# Patient Record
Sex: MALE | Race: WHITE | NOT HISPANIC OR LATINO | Employment: OTHER | URBAN - METROPOLITAN AREA
[De-identification: names, ages, dates, MRNs, and addresses within clinical notes are randomized per-mention and may not be internally consistent; named-entity substitution may affect disease eponyms.]

---

## 2020-01-22 ENCOUNTER — APPOINTMENT (OUTPATIENT)
Dept: CT IMAGING | Facility: HOSPITAL | Age: 75
End: 2020-01-22

## 2020-01-22 ENCOUNTER — APPOINTMENT (OUTPATIENT)
Dept: GENERAL RADIOLOGY | Facility: HOSPITAL | Age: 75
End: 2020-01-22

## 2020-01-22 ENCOUNTER — HOSPITAL ENCOUNTER (EMERGENCY)
Facility: HOSPITAL | Age: 75
Discharge: HOME | End: 2020-01-22
Attending: EMERGENCY MEDICINE | Admitting: EMERGENCY MEDICINE

## 2020-01-22 VITALS
WEIGHT: 192.9 LBS | DIASTOLIC BLOOD PRESSURE: 92 MMHG | OXYGEN SATURATION: 97 % | SYSTOLIC BLOOD PRESSURE: 152 MMHG | HEART RATE: 74 BPM | HEIGHT: 73 IN | BODY MASS INDEX: 25.57 KG/M2 | TEMPERATURE: 98.1 F | RESPIRATION RATE: 16 BRPM

## 2020-01-22 DIAGNOSIS — S01.81XA CHIN LACERATION, INITIAL ENCOUNTER: ICD-10-CM

## 2020-01-22 DIAGNOSIS — S02.609A CLOSED FRACTURE OF LEFT SIDE OF MANDIBLE, UNSPECIFIED MANDIBULAR SITE, INITIAL ENCOUNTER (HCC): Primary | ICD-10-CM

## 2020-01-22 PROCEDURE — 96365 THER/PROPH/DIAG IV INF INIT: CPT

## 2020-01-22 PROCEDURE — 70486 CT MAXILLOFACIAL W/O DYE: CPT

## 2020-01-22 PROCEDURE — 70450 CT HEAD/BRAIN W/O DYE: CPT

## 2020-01-22 PROCEDURE — 25010000003 CEFAZOLIN 1-4 GM/50ML-% SOLUTION: Performed by: EMERGENCY MEDICINE

## 2020-01-22 PROCEDURE — 72125 CT NECK SPINE W/O DYE: CPT

## 2020-01-22 PROCEDURE — 99284 EMERGENCY DEPT VISIT MOD MDM: CPT

## 2020-01-22 PROCEDURE — 71046 X-RAY EXAM CHEST 2 VIEWS: CPT

## 2020-01-22 RX ORDER — CEFAZOLIN SODIUM 1 G/50ML
1 INJECTION, SOLUTION INTRAVENOUS ONCE
Status: COMPLETED | OUTPATIENT
Start: 2020-01-22 | End: 2020-01-22

## 2020-01-22 RX ORDER — LIDOCAINE HYDROCHLORIDE AND EPINEPHRINE 10; 10 MG/ML; UG/ML
10 INJECTION, SOLUTION INFILTRATION; PERINEURAL ONCE
Status: COMPLETED | OUTPATIENT
Start: 2020-01-22 | End: 2020-01-22

## 2020-01-22 RX ORDER — CEPHALEXIN 250 MG/5ML
500 POWDER, FOR SUSPENSION ORAL 2 TIMES DAILY
Qty: 140 ML | Refills: 0 | Status: SHIPPED | OUTPATIENT
Start: 2020-01-22 | End: 2020-01-29

## 2020-01-22 RX ADMIN — LIDOCAINE HYDROCHLORIDE,EPINEPHRINE BITARTRATE 10 ML: 10; .01 INJECTION, SOLUTION INFILTRATION; PERINEURAL at 15:00

## 2020-01-22 RX ADMIN — CEFAZOLIN SODIUM 1 G: 1 INJECTION, SOLUTION INTRAVENOUS at 12:48

## 2020-12-02 ENCOUNTER — APPOINTMENT (OUTPATIENT)
Dept: CT IMAGING | Facility: HOSPITAL | Age: 75
End: 2020-12-02

## 2020-12-02 ENCOUNTER — HOSPITAL ENCOUNTER (EMERGENCY)
Facility: HOSPITAL | Age: 75
Discharge: HOME | End: 2020-12-02
Attending: EMERGENCY MEDICINE | Admitting: EMERGENCY MEDICINE

## 2020-12-02 VITALS
RESPIRATION RATE: 17 BRPM | WEIGHT: 192 LBS | BODY MASS INDEX: 26.88 KG/M2 | DIASTOLIC BLOOD PRESSURE: 72 MMHG | HEIGHT: 71 IN | SYSTOLIC BLOOD PRESSURE: 131 MMHG | HEART RATE: 72 BPM | TEMPERATURE: 98.1 F | OXYGEN SATURATION: 97 %

## 2020-12-02 DIAGNOSIS — S09.90XA CLOSED HEAD INJURY, INITIAL ENCOUNTER: Primary | ICD-10-CM

## 2020-12-02 DIAGNOSIS — F03.91 DEMENTIA WITH BEHAVIORAL DISTURBANCE, UNSPECIFIED DEMENTIA TYPE: ICD-10-CM

## 2020-12-02 PROCEDURE — 25010000002 LORAZEPAM PER 2 MG: Performed by: EMERGENCY MEDICINE

## 2020-12-02 PROCEDURE — 70450 CT HEAD/BRAIN W/O DYE: CPT

## 2020-12-02 PROCEDURE — 96372 THER/PROPH/DIAG INJ SC/IM: CPT

## 2020-12-02 PROCEDURE — 99283 EMERGENCY DEPT VISIT LOW MDM: CPT

## 2020-12-02 RX ORDER — LORAZEPAM 2 MG/ML
2 INJECTION INTRAMUSCULAR EVERY 4 HOURS PRN
Status: DISCONTINUED | OUTPATIENT
Start: 2020-12-02 | End: 2020-12-02 | Stop reason: HOSPADM

## 2020-12-02 RX ADMIN — LORAZEPAM 2 MG: 2 INJECTION INTRAMUSCULAR; INTRAVENOUS at 12:52

## 2020-12-02 NOTE — ED PROVIDER NOTES
EMERGENCY DEPARTMENT ENCOUNTER    Room Number:  34/34  Date of encounter:  12/2/2020  PCP: Claudia Fagan MD  Historian: Patient     I used full protective equipment while examining this patient.  This includes face mask, gloves and protective eyewear.  I washed my hands before entering the room and immediately upon leaving the room      HPI:  Chief Complaint: Fall  A complete HPI/ROS/PMH/PSH/SH/FH are unobtainable due to: Traumatic brain injury, dementia    Context: Ramsey Lombardi is a 75 y.o. male who presents to the ED c/o fall.  Patient was transferred from the [facility] where he is being treated for aggressive behavior with dementia.  He is receiving both Depakote and Ativan.  He had an unwitnessed fall today and there were reports of left facial pain and left leg pain.  Patient does not complain of pain here in the ED but does appear to have chronic confusion.  He can answer his name and follows some simple commands but is confused and continues to try to get out of bed.      PAST MEDICAL HISTORY  Active Ambulatory Problems     Diagnosis Date Noted   • No Active Ambulatory Problems     Resolved Ambulatory Problems     Diagnosis Date Noted   • No Resolved Ambulatory Problems     Past Medical History:   Diagnosis Date   • Dementia (CMS/HCC)          PAST SURGICAL HISTORY  History reviewed. No pertinent surgical history.      FAMILY HISTORY  No family history on file.      SOCIAL HISTORY  Social History     Socioeconomic History   • Marital status: Single     Spouse name: Not on file   • Number of children: Not on file   • Years of education: Not on file   • Highest education level: Not on file   Tobacco Use   • Smoking status: Former Smoker   Substance and Sexual Activity   • Alcohol use: Not Currently   • Drug use: Never   • Sexual activity: Defer         ALLERGIES  Patient has no known allergies.       REVIEW OF SYSTEMS  Review of Systems   Unable to perform ROS: Dementia           PHYSICAL  EXAM    I have reviewed the triage vital signs and nursing notes.    ED Triage Vitals [12/02/20 1132]   Temp Heart Rate Resp BP SpO2   -- 72 16 112/74 98 %      Temp src Heart Rate Source Patient Position BP Location FiO2 (%)   -- -- -- -- --       Physical Exam  GENERAL: Elderly appearing male in no obvious distress, oriented x1  HENT: nares patent, atraumatic  EYES: no scleral icterus  CV: regular rhythm, regular rate  RESPIRATORY: normal effort, clear to auscultation bilateral  ABDOMEN: soft nontender  MUSCULOSKELETAL: Examination of the cervical, thoracic and lumbar spine is unremarkable without apparent tenderness to palpation.  Examination of the extremities x4 is also unremarkable without evidence of acute traumatic injury.  NEURO: Strength, sensation, and coordination are grossly intact.  Patient with chronic appearing dementia.  He is oriented x1.  SKIN: warm, dry      LAB RESULTS  No results found for this or any previous visit (from the past 24 hour(s)).    Ordered the above labs and independently reviewed the results.      RADIOLOGY  Ct Head Without Contrast    Result Date: 12/2/2020  EMERGENCY NONCONTRAST HEAD CT 12/02/2020  CLINICAL HISTORY: Patient fell, was being transferred from [facility] where he is being treated and had an unwitnessed fall today.  TECHNIQUE: Spiral CT images were obtained from the base of the skull to the vertex without intravenous contrast. Due to motion degradation some of the higher up images repeat spiral CT images were obtained through the superior half of the head. All of the Images were reformatted and submitted in 3 mm thick axial CT section with brain algorithm and 2 mm thick axial CT section with high-resolution bone algorithm and 2 mm thick sagittal and coronal reconstructions were performed and submitted in brain algorithm.  COMPARISON: This is correlated to prior noncontrast head CT from [facility] on 01/22/2020.  FINDINGS: There are patchy and  confluent areas of low-density in the periventricular and subcortical white matter of cerebral hemispheres consistent with moderate-to-severe small vessel disease. There is diffuse cerebral atrophy. Lateral and third ventricles are prominent in size and felt to be due to central volume loss or atrophy. I see no mass effect and no midline shift. No extra-axial fluid collections are identified and there is no evidence of acute intracranial hemorrhage. There is a 1 cm mucous retention cyst in the anteromedial left maxillary sinus. Otherwise the paranasal sinuses and mastoid air cells and middle ear cavities are clear. There is an old healed fracture at the junction of the left mandibular head and neck with subluxation of left mandibular head anterior inferomedially at the left glenoid fossa.      1. No acute abnormality seen with no significant change when compared to prior head CT from [facility] on 01/22/2020. 2. Since prior head CT 01/22/2020 there has been healing of the fracture at the junction of the left mandibular head and neck. There is chronic anterior inferomedial subluxation or dislocation left mandibular head with respect to left glenoid. 3. Moderate small vessel disease in the cerebral white matter and there is focal 2 cm area of encephalomalacia in the anterior inferior tip of the right temporal lobe felt to be an old posttraumatic area of encephalomalacia. There is diffuse cerebral atrophy and the lateral and third ventricles are prominent in size, felt to be on the basis of central volume loss or atrophy. There is a 1 cm mucous retention cyst in the anterior-inferior medial left maxillary sinus. The remainder of the head CT is normal with no acute skull fracture or intracranial hemorrhage identified.  Radiation dose reduction techniques were utilized, including automated exposure control and exposure modulation based on body size.         I ordered the above noted radiological studies.  Reviewed by me and discussed with radiologist.  See dictation for official radiology interpretation.      PROCEDURES  Procedures      MEDICATIONS GIVEN IN ER    Medications   LORazepam (ATIVAN) injection 2 mg (2 mg Intramuscular Given 12/2/20 1252)         PROGRESS, DATA ANALYSIS, CONSULTS, AND MEDICAL DECISION MAKING    All labs have been independently reviewed by me.  All radiology studies have been reviewed by me and discussed with radiologist dictating the report.   EKG's independently viewed and interpreted by me.  Discussion below represents my analysis of pertinent findings related to patient's condition, differential diagnosis, treatment plan and final disposition.      ED Course as of Dec 02 1451   Wed Dec 02, 2020   1207 MDM I reviewed medicines and notes sent from the [facility].  Patient with chronic dementia.  Exam is not highly suggestive that there is any fracture or significant injury but given his dementia brain injury would be difficult to assess given his poor baseline to begin with.  Will get CT scan of the head.    [DB]   1450 I discussed head CT with Dr. Jeovany Santos who reports no acute traumatic injury.  There is prior traumatic brain injury and atrophy type changes.  Nothing acute.    [DB]      ED Course User Index  [DB] Narciso Waldron MD       AS OF 14:51 EST VITALS:    BP - 131/72  HR - 72  TEMP - 98.1 °F (36.7 °C) (Oral)  O2 SATS - 97%      DIAGNOSIS  Final diagnoses:   Closed head injury, initial encounter   Dementia with behavioral disturbance, unspecified dementia type (CMS/[facility])         DISPOSITION  DISCHARGE    Patient discharged in stable condition.    Reviewed implications of results, diagnosis, meds, responsibility to follow up, warning signs and symptoms of possible worsening, potential complications and reasons to return to ER, including worsening symptoms or as needed.    Patient/Family voiced understanding of above instructions.    Discussed plan for discharge, as there is  no emergent indication for admission. Patient referred to primary care provider for BP management due to today's BP. Pt/family is agreeable and understands need for follow up and repeat testing.  Pt is aware that discharge does not mean that nothing is wrong but it indicates no emergency is present that requires admission and they must continue care with follow-up as given below or physician of their choice.     FOLLOW-UP  Claudia Fagan MD  [address] [address]  [phone]      As needed         Medication List      No changes were made to your prescriptions during this visit.                Narciso Waldron MD  12/02/20 [id number]

## 2020-12-02 NOTE — ED TRIAGE NOTES
From the [facility] for an unwitnessed fall. Had  Ativan and Depakote this AM prior to fall and has had multiple falls recently      Mask placed on patient in triage. Triage staff wore appropriate PPE during interaction with patient.

## 2020-12-02 NOTE — PROGRESS NOTES
Call placed to [facility] to schedule transport to [facility] per primary RN request. Spoke to [facility] and scheduled transport for 1700 ETA; notified RN. Ignacia Sands RN

## 2020-12-04 ENCOUNTER — APPOINTMENT (OUTPATIENT)
Dept: GENERAL RADIOLOGY | Facility: HOSPITAL | Age: 75
End: 2020-12-04

## 2020-12-04 ENCOUNTER — APPOINTMENT (OUTPATIENT)
Dept: CT IMAGING | Facility: HOSPITAL | Age: 75
End: 2020-12-04

## 2020-12-04 ENCOUNTER — HOSPITAL ENCOUNTER (EMERGENCY)
Facility: HOSPITAL | Age: 75
Discharge: NURSING, REHAB OR LONG-TERM CARE | End: 2020-12-04
Attending: EMERGENCY MEDICINE | Admitting: EMERGENCY MEDICINE

## 2020-12-04 VITALS
SYSTOLIC BLOOD PRESSURE: 102 MMHG | DIASTOLIC BLOOD PRESSURE: 72 MMHG | HEART RATE: 100 BPM | WEIGHT: 192 LBS | BODY MASS INDEX: 26.88 KG/M2 | RESPIRATION RATE: 18 BRPM | TEMPERATURE: 98.1 F | HEIGHT: 71 IN | OXYGEN SATURATION: 97 %

## 2020-12-04 DIAGNOSIS — T50.905A ADVERSE EFFECT OF DRUG, INITIAL ENCOUNTER: ICD-10-CM

## 2020-12-04 DIAGNOSIS — F03.91 DEMENTIA WITH BEHAVIORAL DISTURBANCE, UNSPECIFIED DEMENTIA TYPE: Primary | ICD-10-CM

## 2020-12-04 LAB
ALBUMIN SERPL-MCNC: 4.2 G/DL (ref 3.5–5.2)
ALBUMIN/GLOB SERPL: 1.4 G/DL
ALP SERPL-CCNC: 81 U/L (ref 39–117)
ALT SERPL W P-5'-P-CCNC: 24 U/L (ref 1–41)
AMORPH URATE CRY URNS QL MICRO: ABNORMAL /HPF
ANION GAP SERPL CALCULATED.3IONS-SCNC: 15.5 MMOL/L (ref 5–15)
AST SERPL-CCNC: 43 U/L (ref 1–40)
BACTERIA UR QL AUTO: ABNORMAL /HPF
BASOPHILS # BLD AUTO: 0.02 10*3/MM3 (ref 0–0.2)
BASOPHILS NFR BLD AUTO: 0.2 % (ref 0–1.5)
BILIRUB SERPL-MCNC: 1.1 MG/DL (ref 0–1.2)
BILIRUB UR QL STRIP: NEGATIVE
BUN SERPL-MCNC: 19 MG/DL (ref 8–23)
BUN/CREAT SERPL: 16.7 (ref 7–25)
CALCIUM SPEC-SCNC: 9.3 MG/DL (ref 8.6–10.5)
CHLORIDE SERPL-SCNC: 101 MMOL/L (ref 98–107)
CLARITY UR: ABNORMAL
CO2 SERPL-SCNC: 23.5 MMOL/L (ref 22–29)
COLOR UR: ABNORMAL
CREAT SERPL-MCNC: 1.14 MG/DL (ref 0.76–1.27)
DEPRECATED RDW RBC AUTO: 39.8 FL (ref 37–54)
EOSINOPHIL # BLD AUTO: 0.02 10*3/MM3 (ref 0–0.4)
EOSINOPHIL NFR BLD AUTO: 0.2 % (ref 0.3–6.2)
ERYTHROCYTE [DISTWIDTH] IN BLOOD BY AUTOMATED COUNT: 12.2 % (ref 12.3–15.4)
GFR SERPL CREATININE-BSD FRML MDRD: 63 ML/MIN/1.73
GLOBULIN UR ELPH-MCNC: 3.1 GM/DL
GLUCOSE SERPL-MCNC: 95 MG/DL (ref 65–99)
GLUCOSE UR STRIP-MCNC: NEGATIVE MG/DL
GRAN CASTS URNS QL MICRO: ABNORMAL /LPF
HCT VFR BLD AUTO: 46.8 % (ref 37.5–51)
HGB BLD-MCNC: 15.9 G/DL (ref 13–17.7)
HGB UR QL STRIP.AUTO: ABNORMAL
HYALINE CASTS UR QL AUTO: ABNORMAL /LPF
IMM GRANULOCYTES # BLD AUTO: 0.05 10*3/MM3 (ref 0–0.05)
IMM GRANULOCYTES NFR BLD AUTO: 0.5 % (ref 0–0.5)
KETONES UR QL STRIP: ABNORMAL
LEUKOCYTE ESTERASE UR QL STRIP.AUTO: NEGATIVE
LYMPHOCYTES # BLD AUTO: 0.9 10*3/MM3 (ref 0.7–3.1)
LYMPHOCYTES NFR BLD AUTO: 9.8 % (ref 19.6–45.3)
MCH RBC QN AUTO: 30.3 PG (ref 26.6–33)
MCHC RBC AUTO-ENTMCNC: 34 G/DL (ref 31.5–35.7)
MCV RBC AUTO: 89.3 FL (ref 79–97)
MONOCYTES # BLD AUTO: 1.21 10*3/MM3 (ref 0.1–0.9)
MONOCYTES NFR BLD AUTO: 13.2 % (ref 5–12)
NEUTROPHILS NFR BLD AUTO: 6.98 10*3/MM3 (ref 1.7–7)
NEUTROPHILS NFR BLD AUTO: 76.1 % (ref 42.7–76)
NITRITE UR QL STRIP: NEGATIVE
NRBC BLD AUTO-RTO: 0 /100 WBC (ref 0–0.2)
PH UR STRIP.AUTO: 5.5 [PH] (ref 5–8)
PLATELET # BLD AUTO: 170 10*3/MM3 (ref 140–450)
PMV BLD AUTO: 9.8 FL (ref 6–12)
POTASSIUM SERPL-SCNC: 4.4 MMOL/L (ref 3.5–5.2)
PROT SERPL-MCNC: 7.3 G/DL (ref 6–8.5)
PROT UR QL STRIP: ABNORMAL
RBC # BLD AUTO: 5.24 10*6/MM3 (ref 4.14–5.8)
RBC # UR: ABNORMAL /HPF
REF LAB TEST METHOD: ABNORMAL
SODIUM SERPL-SCNC: 140 MMOL/L (ref 136–145)
SP GR UR STRIP: 1.03 (ref 1–1.03)
SQUAMOUS #/AREA URNS HPF: ABNORMAL /HPF
UROBILINOGEN UR QL STRIP: ABNORMAL
VALPROATE SERPL-MCNC: 25 MCG/ML (ref 50–125)
WBC # BLD AUTO: 9.18 10*3/MM3 (ref 3.4–10.8)
WBC UR QL AUTO: ABNORMAL /HPF

## 2020-12-04 PROCEDURE — 99284 EMERGENCY DEPT VISIT MOD MDM: CPT

## 2020-12-04 PROCEDURE — 71045 X-RAY EXAM CHEST 1 VIEW: CPT

## 2020-12-04 PROCEDURE — 70450 CT HEAD/BRAIN W/O DYE: CPT

## 2020-12-04 PROCEDURE — 25010000002 ZIPRASIDONE MESYLATE PER 10 MG: Performed by: EMERGENCY MEDICINE

## 2020-12-04 PROCEDURE — 25010000002 LORAZEPAM PER 2 MG: Performed by: EMERGENCY MEDICINE

## 2020-12-04 PROCEDURE — 80053 COMPREHEN METABOLIC PANEL: CPT | Performed by: EMERGENCY MEDICINE

## 2020-12-04 PROCEDURE — 85025 COMPLETE CBC W/AUTO DIFF WBC: CPT | Performed by: EMERGENCY MEDICINE

## 2020-12-04 PROCEDURE — 96372 THER/PROPH/DIAG INJ SC/IM: CPT

## 2020-12-04 PROCEDURE — 80164 ASSAY DIPROPYLACETIC ACD TOT: CPT | Performed by: EMERGENCY MEDICINE

## 2020-12-04 PROCEDURE — 81001 URINALYSIS AUTO W/SCOPE: CPT | Performed by: EMERGENCY MEDICINE

## 2020-12-04 RX ORDER — LORAZEPAM 2 MG/ML
1 INJECTION INTRAMUSCULAR ONCE
Status: COMPLETED | OUTPATIENT
Start: 2020-12-04 | End: 2020-12-04

## 2020-12-04 RX ORDER — ZIPRASIDONE MESYLATE 20 MG/ML
10 INJECTION, POWDER, LYOPHILIZED, FOR SOLUTION INTRAMUSCULAR ONCE
Status: COMPLETED | OUTPATIENT
Start: 2020-12-04 | End: 2020-12-04

## 2020-12-04 RX ORDER — DIVALPROEX SODIUM 500 MG/1
500 TABLET, DELAYED RELEASE ORAL 3 TIMES DAILY
COMMUNITY

## 2020-12-04 RX ORDER — LORAZEPAM 2 MG/ML
INJECTION INTRAMUSCULAR
Status: COMPLETED
Start: 2020-12-04 | End: 2020-12-04

## 2020-12-04 RX ORDER — TRAZODONE HYDROCHLORIDE 50 MG/1
50 TABLET ORAL NIGHTLY
COMMUNITY

## 2020-12-04 RX ORDER — MULTIPLE VITAMINS W/ MINERALS TAB 9MG-400MCG
1 TAB ORAL DAILY
COMMUNITY

## 2020-12-04 RX ORDER — LANOLIN ALCOHOL/MO/W.PET/CERES
1000 CREAM (GRAM) TOPICAL DAILY
COMMUNITY

## 2020-12-04 RX ORDER — DONEPEZIL HYDROCHLORIDE 10 MG/1
10 TABLET, FILM COATED ORAL NIGHTLY
COMMUNITY

## 2020-12-04 RX ORDER — AMITRIPTYLINE HYDROCHLORIDE 100 MG/1
500 TABLET, FILM COATED ORAL EVERY EVENING
COMMUNITY

## 2020-12-04 RX ORDER — LORAZEPAM 2 MG/ML
2 INJECTION INTRAMUSCULAR ONCE
Status: COMPLETED | OUTPATIENT
Start: 2020-12-04 | End: 2020-12-04

## 2020-12-04 RX ADMIN — LORAZEPAM 2 MG: 2 INJECTION INTRAMUSCULAR; INTRAVENOUS at 14:23

## 2020-12-04 RX ADMIN — ZIPRASIDONE MESYLATE 10 MG: 20 INJECTION, POWDER, LYOPHILIZED, FOR SOLUTION INTRAMUSCULAR at 14:23

## 2020-12-04 RX ADMIN — ZIPRASIDONE MESYLATE 10 MG: 20 INJECTION, POWDER, LYOPHILIZED, FOR SOLUTION INTRAMUSCULAR at 15:07

## 2020-12-04 RX ADMIN — LORAZEPAM 1 MG: 2 INJECTION INTRAMUSCULAR; INTRAVENOUS at 17:08

## 2020-12-04 NOTE — ED TRIAGE NOTES
Pt arrives via ems from the [facility]. Pt was seen here 2 days ago following a fall. EMS reports that pt has had increased falls. Staff there is unsure if pt received 1mg Ativan or 2mg ativan this morning. Ativan and depakote added to pts MAR recently. Staff believes pt to be over medicated which is causing his falls. Pt has no complaints of pain at this time. Patient masked at arrival and triage staff wore all appropriate PPE during entire encounter with patient.

## 2020-12-04 NOTE — ED PROVIDER NOTES
EMERGENCY DEPARTMENT ENCOUNTER    Room Number:  26/26  PCP: Claudia Fagan MD  Historian: Nursing home  History Limited By: Dementia altered mental status      HPI  Chief Complaint: Altered mental status  Context: Ramsey Lombardi is a 75 y.o. male who presents to the ED c/o altered mental status.  Patient presents from the [facility] for increasing lethargy.  Patient has history of dementia as well as some aggressive behavior recently.  Is inpatient at the [facility].  Patient has had falls recently and has been more sleepy.  In looking at the MAR patient has been given Zyprexa, Ativan recently for agitation.  Patient also started on Depakote and takes trazodone and amitriptyline.  Patient has had no vomiting or diarrhea.  Patient apparently fell 2 days ago and has skin tear on his left arm and abrasion to his right face.  Patient states he is in no pain          MEDICAL RECORD REVIEW    Patient was here 2 days ago for fall and had head CT that was negative          PAST MEDICAL HISTORY  Active Ambulatory Problems     Diagnosis Date Noted   • No Active Ambulatory Problems     Resolved Ambulatory Problems     Diagnosis Date Noted   • No Resolved Ambulatory Problems     Past Medical History:   Diagnosis Date   • Dementia (CMS/HCC)          PAST SURGICAL HISTORY  History reviewed. No pertinent surgical history.      FAMILY HISTORY  History reviewed. No pertinent family history.      SOCIAL HISTORY  Social History     Socioeconomic History   • Marital status: Single     Spouse name: Not on file   • Number of children: Not on file   • Years of education: Not on file   • Highest education level: Not on file   Tobacco Use   • Smoking status: Former Smoker   Substance and Sexual Activity   • Alcohol use: Not Currently   • Drug use: Never   • Sexual activity: Defer         ALLERGIES  Patient has no known allergies.        REVIEW OF SYSTEMS  Review of Systems   Unable to perform ROS: Dementia            PHYSICAL EXAM  ED Triage  Vitals [12/04/20 1128]   Temp Heart Rate Resp BP SpO2   98.1 °F (36.7 °C) 80 18 130/70 95 %      Temp src Heart Rate Source Patient Position BP Location FiO2 (%)   Tympanic Monitor Lying -- --       Physical Exam   Constitutional: No distress.   Patient is sleepy but arousable and answer some questions   HENT:   Head: Normocephalic and atraumatic.   Eyes: Pupils are equal, round, and reactive to light. EOM are normal.   Neck: Normal range of motion. Neck supple.   Cardiovascular: Normal rate, regular rhythm and normal heart sounds.   Pulmonary/Chest: Effort normal and breath sounds normal. No respiratory distress.   Abdominal: Soft. There is no abdominal tenderness. There is no rebound and no guarding.   Musculoskeletal: Normal range of motion.         General: No edema.   Neurological: He has normal sensation and normal strength.   Patient will arouse and answer questions.  Will follow commands.   Skin: Skin is warm and dry.   Patient has abrasion to right cheek.  Patient also has a skin tear to his left arm   Psychiatric: Mood and affect normal.   Nursing note and vitals reviewed.    Patient was wearing a face mask when I entered the room and they continued to wear a mask throughout their stay in the ED.  I wore PPE, including gloves, face mask with shield or face mask with goggles whenever I was in the room with patient.       LAB RESULTS  Recent Results (from the past 24 hour(s))   Comprehensive Metabolic Panel    Collection Time: 12/04/20 12:14 PM    Specimen: Blood   Result Value Ref Range    Glucose 95 65 - 99 mg/dL    BUN 19 8 - 23 mg/dL    Creatinine 1.14 0.76 - 1.27 mg/dL    Sodium 140 136 - 145 mmol/L    Potassium 4.4 3.5 - 5.2 mmol/L    Chloride 101 98 - 107 mmol/L    CO2 23.5 22.0 - 29.0 mmol/L    Calcium 9.3 8.6 - 10.5 mg/dL    Total Protein 7.3 6.0 - 8.5 g/dL    Albumin 4.20 3.50 - 5.20 g/dL    ALT (SGPT) 24 1 - 41 U/L    AST (SGOT) 43 (H) 1 - 40 U/L    Alkaline Phosphatase 81 39 - 117 U/L    Total  Bilirubin 1.1 0.0 - 1.2 mg/dL    eGFR Non African Amer 63 >60 mL/min/1.73    Globulin 3.1 gm/dL    A/G Ratio 1.4 g/dL    BUN/Creatinine Ratio 16.7 7.0 - 25.0    Anion Gap 15.5 (H) 5.0 - 15.0 mmol/L   CBC Auto Differential    Collection Time: 12/04/20 12:14 PM    Specimen: Blood   Result Value Ref Range    WBC 9.18 3.40 - 10.80 10*3/mm3    RBC 5.24 4.14 - 5.80 10*6/mm3    Hemoglobin 15.9 13.0 - 17.7 g/dL    Hematocrit 46.8 37.5 - 51.0 %    MCV 89.3 79.0 - 97.0 fL    MCH 30.3 26.6 - 33.0 pg    MCHC 34.0 31.5 - 35.7 g/dL    RDW 12.2 (L) 12.3 - 15.4 %    RDW-SD 39.8 37.0 - 54.0 fl    MPV 9.8 6.0 - 12.0 fL    Platelets 170 140 - 450 10*3/mm3    Neutrophil % 76.1 (H) 42.7 - 76.0 %    Lymphocyte % 9.8 (L) 19.6 - 45.3 %    Monocyte % 13.2 (H) 5.0 - 12.0 %    Eosinophil % 0.2 (L) 0.3 - 6.2 %    Basophil % 0.2 0.0 - 1.5 %    Immature Grans % 0.5 0.0 - 0.5 %    Neutrophils, Absolute 6.98 1.70 - 7.00 10*3/mm3    Lymphocytes, Absolute 0.90 0.70 - 3.10 10*3/mm3    Monocytes, Absolute 1.21 (H) 0.10 - 0.90 10*3/mm3    Eosinophils, Absolute 0.02 0.00 - 0.40 10*3/mm3    Basophils, Absolute 0.02 0.00 - 0.20 10*3/mm3    Immature Grans, Absolute 0.05 0.00 - 0.05 10*3/mm3    nRBC 0.0 0.0 - 0.2 /100 WBC   Valproic Acid Level, Total    Collection Time: 12/04/20 12:14 PM    Specimen: Blood   Result Value Ref Range    Valproic Acid 25.0 (L) 50.0 - 125.0 mcg/mL   Urinalysis With Microscopic If Indicated (No Culture) - Urine, Clean Catch    Collection Time: 12/04/20 12:46 PM    Specimen: Urine, Clean Catch   Result Value Ref Range    Color, UA Dark Yellow (A) Yellow, Straw    Appearance, UA Cloudy (A) Clear    pH, UA 5.5 5.0 - 8.0    Specific Gravity, UA 1.028 1.005 - 1.030    Glucose, UA Negative Negative    Ketones, UA 80 mg/dL (3+) (A) Negative    Bilirubin, UA Negative Negative    Blood, UA Large (3+) (A) Negative    Protein, UA >=300 mg/dL (3+) (A) Negative    Leuk Esterase, UA Negative Negative    Nitrite, UA Negative Negative     Urobilinogen, UA 1.0 E.U./dL 0.2 - 1.0 E.U./dL   Urinalysis, Microscopic Only - Urine, Clean Catch    Collection Time: 12/04/20 12:46 PM    Specimen: Urine, Clean Catch   Result Value Ref Range    RBC, UA 13-20 (A) None Seen, 0-2 /HPF    WBC, UA 3-5 (A) None Seen, 0-2 /HPF    Bacteria, UA 1+ (A) None Seen /HPF    Squamous Epithelial Cells, UA None Seen None Seen, 0-2 /HPF    Hyaline Casts, UA 0-2 None Seen /LPF    Granular Casts, UA 0-2 None Seen /LPF    Amorphous Crystals, UA Small/1+ None Seen /HPF    Methodology Manual Light Microscopy        Ordered the above labs and reviewed the results.        RADIOLOGY  CT Head Without Contrast   Final Result   1. No significant change when compared to head CT just 2 days ago on   12/02/2020 with no acute intracranial abnormality seen.   2. There is an old healed fracture of the junction of the left   mandibular head and neck with the mandibular head displaced   anterior-inferior medially with regard to the glenoid.   3. Moderate-to-severe small vessel disease in the cerebral white matter.   There is diffuse cerebral atrophy and the lateral and third ventricles   are prominent in size felt to be due to volume loss or atrophy. There is   a 3.4 x 1.8 cm of encephalomalacia in the anterior inferior tip of the   right temporal lobe, probable 1 cm area of encephalomalacia   anterior-inferior to the left temporal lobe and these are good locations   for old posttraumatic encephalomalacia.   4. There are multiple periapical lucencies adjacent to the roots of   multiple maxillary teeth compatible with multiple small periapical   abscesses, the largest is adjacent to the medial right maxillary incisor   tooth #8 and left lateral maxillary incisor tooth #10 and premolar tooth   #12. Correlation with dental exam is suggested. The remainder of the   head CT is unremarkable.       Radiation dose reduction techniques were utilized, including automated   exposure control and exposure  modulation based on body size.       This report was finalized on 12/4/2020 4:53 PM by Dr. Nael Bee M.D.          XR Chest 1 View   Final Result   No focal pulmonary consolidation. Follow-up as clinical   indications persist.       This report was finalized on 12/4/2020 1:59 PM by Dr. Sidney Lucero M.D.               Ordered the above noted radiological studies. Reviewed by me in PACS.  Discussed Head CT with Dr. Bee          PROCEDURES  Procedures              MEDICATIONS GIVEN IN ER  Medications   ziprasidone (GEODON) injection 10 mg (10 mg Intramuscular Given 12/4/20 1423)   LORazepam (ATIVAN) injection 2 mg ( Intramuscular Override Pull-Not given 12/4/20 1432)   ziprasidone (GEODON) injection 10 mg (10 mg Intramuscular Given 12/4/20 1507)   LORazepam (ATIVAN) injection 1 mg (1 mg Intramuscular Given 12/4/20 1708)             PROGRESS AND CONSULTS  ED Course as of Dec 04 1855   Fri Dec 04, 2020   1429 14:29 EST  Patient at the [facility] for dementia with behavioral issues.  Patient has been more lethargic.  Looking at his medicines he has multiple medications that will make him lethargic.  Patient's work-up here is normal including negative head CT valproic acid is normal.  Patient eventually woke up and has become more combative here.  Patient has been given Geodon and Ativan as he is trying to bite people.  We see no new reason for his lethargy other than medications.  He will be sent back to the [facility] for further medication management    [SL]      ED Course User Index  [SL] Jose Hartmann MD           MEDICAL DECISION MAKING      MDM  Number of Diagnoses or Management Options  Adverse effect of drug, initial encounter:   Dementia with behavioral disturbance, unspecified dementia type (CMS/[facility]):      Amount and/or Complexity of Data Reviewed  Clinical lab tests: reviewed and ordered (WBC 9)  Tests in the radiology section of CPT®: reviewed and ordered (Head CT negative)                DIAGNOSIS  Final diagnoses:   Dementia with behavioral disturbance, unspecified dementia type (CMS/[facility])   Adverse effect of drug, initial encounter           DISPOSITION  DISCHARGE    Patient discharged in stable condition.    Reviewed implications of results, diagnosis, meds, responsibility to follow up, warning signs and symptoms of possible worsening, potential complications and reasons to return to ER, including worsening symptoms.    Patient/Family voiced understanding of above instructions.    Discussed plan for discharge, as there is no emergent indication for admission. Patient referred to primary care provider for BP management due to today's BP. Pt/family is agreeable and understands need for follow up and repeat testing.  Pt is aware that discharge does not mean that nothing is wrong but it indicates no emergency is present that requires admission and they must continue care with follow-up as given below or physician of their choice.     FOLLOW-UP  Claudia Fagan MD  [facility] [address]  [phone]    Schedule an appointment as soon as possible for a visit            Medication List      No changes were made to your prescriptions during this visit.             Latest Documented Vital Signs:  As of 18:55 EST  BP- 102/72 HR- 100 Temp- 98.1 °F (36.7 °C) (Tympanic) O2 sat- 97%                         Jose Hartmann MD  12/04/20 [id number]

## 2020-12-04 NOTE — ED NOTES
Pt became combative and aggressive. Staff at bedside and security called.      Lisandra Almeida RN  12/04/20 9468

## 2020-12-04 NOTE — ED NOTES
Pt appears very drowsy and sleepy. Oriented to self and place. This rn wearing mask and goggles. Pt wearing mask during encounter.        Lisandra Almeida, FLACO  12/04/20 9796

## 2020-12-04 NOTE — PROGRESS NOTES
Per request from RN, spoke w/ Rina, [facility], to arrange transport back to [facility] on [facility]; Rina will call back w/ JP. FLACO updated

## 2020-12-04 NOTE — ED NOTES
Pt found out of bed running down the hallway. Patient tripped, staff assisted pt to the ground. MD notified, charge nurse present.      Lisandra Almeida RN  12/04/20 7624